# Patient Record
Sex: MALE | Race: WHITE | Employment: OTHER | URBAN - METROPOLITAN AREA
[De-identification: names, ages, dates, MRNs, and addresses within clinical notes are randomized per-mention and may not be internally consistent; named-entity substitution may affect disease eponyms.]

---

## 2017-05-17 PROBLEM — E66.9 OBESITY (BMI 30.0-34.9): Status: ACTIVE | Noted: 2017-05-17

## 2023-03-14 NOTE — PROGRESS NOTES
ENDOSCOPY PREOP INSTRUCTIONS      You are scheduled for a procedure at [facility] on 3-15 @ 1400. You will need to arrival by: 1230 (at least an hour & a half prior to planned start time)  Report to the MAIN entrance on [address] and register at the surgery center on the left-hand side of the lobby  You will need your insurance card and photo id and a list of all medications taken on a regular basis. Please include the dose/frequency. For your procedure:     PLEASE FOLLOW ALL INSTRUCTIONS & PREPS GIVEN TO YOU BY YOUR DOCTOR'S OFFICE. If you have not received these instructions yet, please call the office immediately. Make sure to read them as soon as received. If you are taking blood thinners, Aspirin or diabetic medication, make sure to call your doctor as soon as possible for instructions prior to your procedure. Please dress comfortably and do not wear any lotion, powders or jewelry  Arrange for someone to be with you and sign you out & drive you home after your procedure. THIS PERSON MUST WAIT AT HOSPITAL THE ENTIRE TIME. We allow 2 adult visitors with you in the hospital & masks are strongly recommended.     WOMEN ONLY OF CHILDBEARING AGE: Please make sure to be able to give a urine sample on arrival      If you have further questions, you may contact your Endoscopist's office or Pre Admission Testing staff at [phone]

## 2023-03-15 ENCOUNTER — HOSPITAL ENCOUNTER (OUTPATIENT)
Age: 58
Setting detail: OUTPATIENT SURGERY
Discharge: HOME | End: 2023-03-15
Attending: INTERNAL MEDICINE | Admitting: INTERNAL MEDICINE
Payer: MEDICARE

## 2023-03-15 ENCOUNTER — ANESTHESIA EVENT (OUTPATIENT)
Dept: ENDOSCOPY | Age: 58
End: 2023-03-15
Payer: MEDICARE

## 2023-03-15 ENCOUNTER — ANESTHESIA (OUTPATIENT)
Dept: ENDOSCOPY | Age: 58
End: 2023-03-15
Payer: MEDICARE

## 2023-03-15 VITALS
RESPIRATION RATE: 16 BRPM | HEIGHT: 71 IN | DIASTOLIC BLOOD PRESSURE: 74 MMHG | BODY MASS INDEX: 37.8 KG/M2 | HEART RATE: 76 BPM | SYSTOLIC BLOOD PRESSURE: 125 MMHG | WEIGHT: 270 LBS | OXYGEN SATURATION: 99 % | TEMPERATURE: 97.2 F

## 2023-03-15 DIAGNOSIS — R10.84 GENERALIZED ABDOMINAL PAIN: ICD-10-CM

## 2023-03-15 DIAGNOSIS — R15.0 FECAL INCONTINENCE WITH INCOMPLETE DEFECATION: ICD-10-CM

## 2023-03-15 DIAGNOSIS — R15.9 FECAL INCONTINENCE WITH INCOMPLETE DEFECATION: ICD-10-CM

## 2023-03-15 PROCEDURE — 7100000001 HC PACU RECOVERY - ADDTL 15 MIN: Performed by: INTERNAL MEDICINE

## 2023-03-15 PROCEDURE — 3700000001 HC ADD 15 MINUTES (ANESTHESIA): Performed by: INTERNAL MEDICINE

## 2023-03-15 PROCEDURE — 6360000002 HC RX W HCPCS: Performed by: ANESTHESIOLOGY

## 2023-03-15 PROCEDURE — 7100000010 HC PHASE II RECOVERY - FIRST 15 MIN: Performed by: INTERNAL MEDICINE

## 2023-03-15 PROCEDURE — 2580000003 HC RX 258: Performed by: NURSE ANESTHETIST, CERTIFIED REGISTERED

## 2023-03-15 PROCEDURE — 7100000011 HC PHASE II RECOVERY - ADDTL 15 MIN: Performed by: INTERNAL MEDICINE

## 2023-03-15 PROCEDURE — 3700000000 HC ANESTHESIA ATTENDED CARE: Performed by: INTERNAL MEDICINE

## 2023-03-15 PROCEDURE — 3609012400 HC EGD TRANSORAL BIOPSY SINGLE/MULTIPLE: Performed by: INTERNAL MEDICINE

## 2023-03-15 PROCEDURE — 2580000003 HC RX 258: Performed by: ANESTHESIOLOGY

## 2023-03-15 PROCEDURE — 3609010300 HC COLONOSCOPY W/BIOPSY SINGLE/MULTIPLE: Performed by: INTERNAL MEDICINE

## 2023-03-15 PROCEDURE — 7100000000 HC PACU RECOVERY - FIRST 15 MIN: Performed by: INTERNAL MEDICINE

## 2023-03-15 PROCEDURE — 2709999900 HC NON-CHARGEABLE SUPPLY: Performed by: INTERNAL MEDICINE

## 2023-03-15 PROCEDURE — 88305 TISSUE EXAM BY PATHOLOGIST: CPT

## 2023-03-15 PROCEDURE — 3609010600 HC COLONOSCOPY POLYPECTOMY SNARE/COLD BIOPSY: Performed by: INTERNAL MEDICINE

## 2023-03-15 PROCEDURE — 6360000002 HC RX W HCPCS: Performed by: NURSE ANESTHETIST, CERTIFIED REGISTERED

## 2023-03-15 RX ORDER — ERGOCALCIFEROL 1.25 MG/1
CAPSULE ORAL
COMMUNITY
Start: 2023-03-13

## 2023-03-15 RX ORDER — PROPOFOL 10 MG/ML
INJECTION, EMULSION INTRAVENOUS PRN
Status: DISCONTINUED | OUTPATIENT
Start: 2023-03-15 | End: 2023-03-15 | Stop reason: SDUPTHER

## 2023-03-15 RX ORDER — SODIUM CHLORIDE 0.9 % (FLUSH) 0.9 %
5-40 SYRINGE (ML) INJECTION EVERY 12 HOURS SCHEDULED
Status: DISCONTINUED | OUTPATIENT
Start: 2023-03-15 | End: 2023-03-15 | Stop reason: HOSPADM

## 2023-03-15 RX ORDER — MEPERIDINE HYDROCHLORIDE 25 MG/ML
12.5 INJECTION INTRAMUSCULAR; INTRAVENOUS; SUBCUTANEOUS EVERY 5 MIN PRN
Status: DISCONTINUED | OUTPATIENT
Start: 2023-03-15 | End: 2023-03-15 | Stop reason: HOSPADM

## 2023-03-15 RX ORDER — SODIUM CHLORIDE, SODIUM LACTATE, POTASSIUM CHLORIDE, CALCIUM CHLORIDE 600; 310; 30; 20 MG/100ML; MG/100ML; MG/100ML; MG/100ML
INJECTION, SOLUTION INTRAVENOUS CONTINUOUS
Status: DISCONTINUED | OUTPATIENT
Start: 2023-03-15 | End: 2023-03-15 | Stop reason: HOSPADM

## 2023-03-15 RX ORDER — DIPHENHYDRAMINE HYDROCHLORIDE 50 MG/ML
12.5 INJECTION INTRAMUSCULAR; INTRAVENOUS
Status: DISCONTINUED | OUTPATIENT
Start: 2023-03-15 | End: 2023-03-15 | Stop reason: HOSPADM

## 2023-03-15 RX ORDER — LIDOCAINE HYDROCHLORIDE 10 MG/ML
1 INJECTION, SOLUTION EPIDURAL; INFILTRATION; INTRACAUDAL; PERINEURAL
Status: DISCONTINUED | OUTPATIENT
Start: 2023-03-15 | End: 2023-03-15 | Stop reason: HOSPADM

## 2023-03-15 RX ORDER — SODIUM CHLORIDE 9 MG/ML
25 INJECTION, SOLUTION INTRAVENOUS PRN
Status: DISCONTINUED | OUTPATIENT
Start: 2023-03-15 | End: 2023-03-15 | Stop reason: HOSPADM

## 2023-03-15 RX ORDER — ESOMEPRAZOLE MAGNESIUM 40 MG/1
40 CAPSULE, DELAYED RELEASE ORAL
Qty: 90 CAPSULE | Refills: 1 | Status: SHIPPED | OUTPATIENT
Start: 2023-03-15

## 2023-03-15 RX ORDER — SODIUM CHLORIDE 0.9 % (FLUSH) 0.9 %
5-40 SYRINGE (ML) INJECTION PRN
Status: DISCONTINUED | OUTPATIENT
Start: 2023-03-15 | End: 2023-03-15 | Stop reason: HOSPADM

## 2023-03-15 RX ORDER — HALOPERIDOL 5 MG/1
TABLET ORAL
COMMUNITY
Start: 2023-03-15

## 2023-03-15 RX ORDER — HYDROCORTISONE ACETATE 25 MG/1
25 SUPPOSITORY RECTAL EVERY 12 HOURS
Qty: 28 SUPPOSITORY | Refills: 1 | Status: SHIPPED | OUTPATIENT
Start: 2023-03-15

## 2023-03-15 RX ORDER — SODIUM CHLORIDE, SODIUM LACTATE, POTASSIUM CHLORIDE, CALCIUM CHLORIDE 600; 310; 30; 20 MG/100ML; MG/100ML; MG/100ML; MG/100ML
INJECTION, SOLUTION INTRAVENOUS CONTINUOUS PRN
Status: DISCONTINUED | OUTPATIENT
Start: 2023-03-15 | End: 2023-03-15 | Stop reason: SDUPTHER

## 2023-03-15 RX ORDER — ONDANSETRON 2 MG/ML
INJECTION INTRAMUSCULAR; INTRAVENOUS PRN
Status: DISCONTINUED | OUTPATIENT
Start: 2023-03-15 | End: 2023-03-15 | Stop reason: SDUPTHER

## 2023-03-15 RX ORDER — LABETALOL HYDROCHLORIDE 5 MG/ML
10 INJECTION, SOLUTION INTRAVENOUS
Status: DISCONTINUED | OUTPATIENT
Start: 2023-03-15 | End: 2023-03-15 | Stop reason: HOSPADM

## 2023-03-15 RX ORDER — LIDOCAINE HYDROCHLORIDE 20 MG/ML
INJECTION, SOLUTION INTRAVENOUS PRN
Status: DISCONTINUED | OUTPATIENT
Start: 2023-03-15 | End: 2023-03-15 | Stop reason: SDUPTHER

## 2023-03-15 RX ORDER — LAMOTRIGINE 200 MG/1
TABLET ORAL
COMMUNITY
Start: 2023-03-15

## 2023-03-15 RX ORDER — SUCCINYLCHOLINE CHLORIDE 20 MG/ML
INJECTION INTRAMUSCULAR; INTRAVENOUS PRN
Status: DISCONTINUED | OUTPATIENT
Start: 2023-03-15 | End: 2023-03-15 | Stop reason: SDUPTHER

## 2023-03-15 RX ORDER — METOCLOPRAMIDE HYDROCHLORIDE 5 MG/ML
10 INJECTION INTRAMUSCULAR; INTRAVENOUS
Status: DISCONTINUED | OUTPATIENT
Start: 2023-03-15 | End: 2023-03-15 | Stop reason: HOSPADM

## 2023-03-15 RX ORDER — HYDRALAZINE HYDROCHLORIDE 20 MG/ML
10 INJECTION INTRAMUSCULAR; INTRAVENOUS
Status: DISCONTINUED | OUTPATIENT
Start: 2023-03-15 | End: 2023-03-15 | Stop reason: HOSPADM

## 2023-03-15 RX ADMIN — HYDROMORPHONE HYDROCHLORIDE 0.5 MG: 1 INJECTION, SOLUTION INTRAMUSCULAR; INTRAVENOUS; SUBCUTANEOUS at 15:15

## 2023-03-15 RX ADMIN — PROPOFOL 200 MG: 10 INJECTION, EMULSION INTRAVENOUS at 13:56

## 2023-03-15 RX ADMIN — SODIUM CHLORIDE, POTASSIUM CHLORIDE, SODIUM LACTATE AND CALCIUM CHLORIDE: 600; 310; 30; 20 INJECTION, SOLUTION INTRAVENOUS at 12:59

## 2023-03-15 RX ADMIN — SODIUM CHLORIDE, SODIUM LACTATE, POTASSIUM CHLORIDE, AND CALCIUM CHLORIDE: .6; .31; .03; .02 INJECTION, SOLUTION INTRAVENOUS at 13:50

## 2023-03-15 RX ADMIN — SUCCINYLCHOLINE CHLORIDE 120 MG: 20 INJECTION, SOLUTION INTRAMUSCULAR; INTRAVENOUS; PARENTERAL at 13:57

## 2023-03-15 RX ADMIN — HYDROMORPHONE HYDROCHLORIDE 0.5 MG: 1 INJECTION, SOLUTION INTRAMUSCULAR; INTRAVENOUS; SUBCUTANEOUS at 14:58

## 2023-03-15 RX ADMIN — LIDOCAINE HYDROCHLORIDE 50 MG: 20 INJECTION, SOLUTION INTRAVENOUS at 13:56

## 2023-03-15 RX ADMIN — ONDANSETRON 4 MG: 2 INJECTION INTRAMUSCULAR; INTRAVENOUS at 14:22

## 2023-03-15 ASSESSMENT — PAIN DESCRIPTION - DESCRIPTORS
DESCRIPTORS: BURNING
DESCRIPTORS: ACHING

## 2023-03-15 ASSESSMENT — PAIN DESCRIPTION - LOCATION
LOCATION: ABDOMEN;BUTTOCKS

## 2023-03-15 ASSESSMENT — PAIN DESCRIPTION - PAIN TYPE: TYPE: SURGICAL PAIN

## 2023-03-15 ASSESSMENT — PAIN SCALES - GENERAL
PAINLEVEL_OUTOF10: 3
PAINLEVEL_OUTOF10: 6
PAINLEVEL_OUTOF10: 6
PAINLEVEL_OUTOF10: 5
PAINLEVEL_OUTOF10: 6

## 2023-03-15 ASSESSMENT — PAIN - FUNCTIONAL ASSESSMENT
PAIN_FUNCTIONAL_ASSESSMENT: 0-10
PAIN_FUNCTIONAL_ASSESSMENT: PREVENTS OR INTERFERES SOME ACTIVE ACTIVITIES AND ADLS
PAIN_FUNCTIONAL_ASSESSMENT: ACTIVITIES ARE NOT PREVENTED
PAIN_FUNCTIONAL_ASSESSMENT: ACTIVITIES ARE NOT PREVENTED

## 2023-03-15 ASSESSMENT — PAIN DESCRIPTION - FREQUENCY
FREQUENCY: CONTINUOUS
FREQUENCY: CONTINUOUS

## 2023-03-15 ASSESSMENT — PAIN DESCRIPTION - ONSET
ONSET: ON-GOING
ONSET: ON-GOING

## 2023-03-15 NOTE — DISCHARGE INSTRUCTIONS
Endoscopy Discharge Instructions    Call the physician that did your procedure for any questions or concerns. You may be drowsy or lightheaded after receiving sedation or anesthesia. Do not operate any vehicles (automobiles, bicycles, motorcycles) or power tools or machinery for 24 hours. Do not sign any legal documents or make any legal decisions for 24 hours. Do not drink alcohol for 24 hours or while taking narcotic pain medication. A responsible person should be with you for the next 24 hours. Plan on bed rest or quiet relaxation today. Resume normal activities in the morning. Resume normal activity tomorrow unless otherwise advised by your physician. Eat a light first meal, avoiding spicy and fatty foods, then resume normal diet unless you are told otherwise by your physician. If the intravenous medication site is painful, apply warm compresses on the site until the soreness is relieved and elevate the arm above the heart. Call your physician if no improvement  in 2-3 days. POSSIBLE SYMPTOMS TO WATCH:     1. fever (greater than 100) 5. increased abdominal bloating   2. severe pain   6. excessive bleeding   3. nausea and vomiting  7. chest pain   4. chills    8. shortness of breath       Notify your physician if these problems occur     Expected as normal and remedies:  Sore throat: use over the counter throat lozenges or gargle with warm salt water. Redness or soreness at the IV site: apply warm compress  Gaseous discomfort: belching or passing flatus (gas). Call for biopsy results in :________________________                    Call for follow-up appointment on:______________________                Educational information given on:_______________________                   We want to thank you for choosing the [facility] as your health care provider. We hope that you received excellent care while you were here.  You may receive a survey in the mail regarding your care. We would appreciate you taking a  few minutes of your time to complete this survey. Again, thank you for choosing the [facility]                   Please review these discharge instructions this evening or tomorrow for               information you may have forgotten.

## 2023-03-15 NOTE — ANESTHESIA POSTPROCEDURE EVALUATION
Department of Anesthesiology  Postprocedure Note    Patient: Juanito Moseley  MRN: [id number]  YOB: 1965  Date of evaluation: 3/15/2023      Procedure Summary     Date: 03/15/23 Room / Location: [address]    Anesthesia Start: 1350 Anesthesia Stop: 8818    Procedures:       EGD BIOPSY      COLONOSCOPY WITH BIOPSY      COLONOSCOPY POLYPECTOMY SNARE/COLD BIOPSY Diagnosis:       Fecal incontinence with incomplete defecation      Generalized abdominal pain      (Fecal incontinence with incomplete defecation [R15.9, R15.0] Generalized abdominal pain [R10.84])    Surgeons: Cristina Cai MD Responsible Provider: Shannan Flowers MD    Anesthesia Type: general ASA Status: 2          Anesthesia Type: No value filed.     Mehnaz Phase I: Mehnaz Score: 10    Mehnaz Phase II: Mehnaz Score: 10      Anesthesia Post Evaluation    Patient location during evaluation: PACU  Patient participation: complete - patient participated  Level of consciousness: awake and alert  Pain score: 0  Airway patency: patent  Nausea & Vomiting: no nausea and no vomiting  Complications: no  Cardiovascular status: hemodynamically stable  Respiratory status: acceptable  Hydration status: euvolemic

## 2023-03-15 NOTE — PROGRESS NOTES
PACU Transfer to [facility]    Procedure(s):  EGD BIOPSY  COLONOSCOPY WITH BIOPSY  COLONOSCOPY POLYPECTOMY SNARE/COLD BIOPSY    Pt's Current Allergies: Morphine and related, Clarithromycin, Peanut-containing drug products, Penicillins, Sulfa antibiotics, and Tetracyclines & related    Pt meets criteria to transfer to next phase of care per GEOFFREY SCORE and ASPAN standards    No results for input(s): POCGLU in the last 72 hours. Vitals:    03/15/23 1515   BP: 132/75   Pulse: 77   Resp: 15   Temp:    SpO2: 100%      BP within 20% of pt's admitting BP as per Geoffrey Score      Intake/Output Summary (Last 24 hours) at 3/15/2023 1519  Last data filed at 3/15/2023 1439  Gross per 24 hour   Intake 500 ml   Output --   Net 500 ml       Pain assessment:  present - adequately treated      Patient was assessed for unknown alterations to skin integrity. There were not unknown alterations observed. Patient transferred to care of Artis Wan RN.    Family updated and directed to Artis Wan    3/15/2023 3:19 PM

## 2023-03-15 NOTE — ANESTHESIA PRE PROCEDURE
Department of Anesthesiology  Preprocedure Note       Name:  Oxana Mueller   Age:  62 y.o.  :  1965                                          MRN:  [id number]         Date:  3/15/2023      Surgeon: Ariadna Alfonso):  Umair Hall MD    Procedure: Procedure(s):  ESOPHAGOGASTRODUODENOSCOPY  COLONOSCOPY    Medications prior to admission:   Prior to Admission medications    Medication Sig Start Date End Date Taking? Authorizing Provider   lamoTRIgine (LAMICTAL) 200 MG tablet  3/15/23   Historical Provider, MD   haloperidol (HALDOL) 5 MG tablet  3/15/23   Historical Provider, MD   vitamin D (ERGOCALCIFEROL) 1.25 MG (36934 UT) CAPS capsule  3/13/23   Historical Provider, MD   cephALEXin (KEFLEX) 500 MG capsule Take 500 mg by mouth 3 times daily    Historical Provider, MD   docusate sodium (COLACE) 100 MG capsule Take 100 mg by mouth 4 times daily    Historical Provider, MD   clonazePAM (KLONOPIN) 2 MG tablet Take 2 mg by mouth 2 times daily 17   Historical Provider, MD   diazepam (VALIUM) 5 MG tablet Take 5 mg by mouth nightly as needed for Anxiety    Historical Provider, MD   HYDROcodone-acetaminophen (NORCO)  MG per tablet Take 1 tablet by mouth every 8 hours as needed for Pain . Historical Provider, MD   Multiple Vitamins-Minerals (THERAPEUTIC MULTIVITAMIN-MINERALS) tablet Take 1 tablet by mouth daily    Historical Provider, MD   sertraline (ZOLOFT) 100 MG tablet Take 100 mg by mouth 2 times daily. Historical Provider, MD   lubiprostone (AMITIZA) 24 MCG capsule Take 24 mcg by mouth 2 times daily (with meals). Patient not taking: Reported on 3/15/2023    Historical Provider, MD   dicyclomine (BENTYL) 10 MG capsule Take 20 mg by mouth 2 times daily. Historical Provider, MD   esomeprazole (NEXIUM) 40 MG capsule Take 40 mg by mouth 2 times daily.       Historical Provider, MD       Current medications:    Current Facility-Administered Medications   Medication Dose Route Frequency Provider Last Rate Last Admin    lidocaine PF 1 % injection 1 mL  1 mL IntraDERmal Once PRN Maria L Craft MD        lactated ringers IV soln infusion   IntraVENous Continuous Maria L Craft MD        sodium chloride flush 0.9 % injection 5-40 mL  5-40 mL IntraVENous 2 times per day Maria L Craft MD        sodium chloride flush 0.9 % injection 5-40 mL  5-40 mL IntraVENous PRN Maria L Craft MD           Allergies: Allergies   Allergen Reactions    Morphine And Related Headaches and Other (See Comments)     Pt. States he gets a migraine      Clarithromycin     Peanut-Containing Drug Products     Penicillins     Sulfa Antibiotics     Tetracyclines & Related        Problem List:    Patient Active Problem List   Diagnosis Code    Obesity (BMI 30.0-34. 9) E66.9       Past Medical History:        Diagnosis Date    Arthritis     Asthma     Chronic back pain     Constipation     Depression     GERD (gastroesophageal reflux disease)     Obesity        Past Surgical History:        Procedure Laterality Date    KNEE SURGERY Bilateral     SPINE SURGERY         Social History:    Social History     Tobacco Use    Smoking status: Never    Smokeless tobacco: Never   Substance Use Topics    Alcohol use: No                                Counseling given: Not Answered      Vital Signs (Current):   Vitals:    03/15/23 1232   BP: 132/79   Pulse: 86   Resp: 16   Temp: 99.7 °F (37.6 °C)   TempSrc: Temporal   SpO2: 97%   Weight: 270 lb (122.5 kg)   Height: 5' 11\" (1.803 m)                                              BP Readings from Last 3 Encounters:   03/15/23 132/79   06/12/17 110/60   05/17/17 120/74       NPO Status:                                                                                 BMI:   Wt Readings from Last 3 Encounters:   03/15/23 270 lb (122.5 kg)   06/12/17 217 lb 8 oz (98.7 kg)   05/17/17 218 lb (98.9 kg)     Body mass index is 37.66 kg/m².     CBC:   Lab Results   Component Value Date/Time    WBC 4.2 04/12/2017 10:23 AM    RBC 4.65 04/12/2017 10:23 AM    HGB 14.2 04/12/2017 10:23 AM    HCT 42.8 04/12/2017 10:23 AM    MCV 92.0 04/12/2017 10:23 AM    RDW 13.5 04/12/2017 10:23 AM     04/12/2017 10:23 AM       CMP:   Lab Results   Component Value Date/Time     04/12/2017 10:23 AM    K 4.8 04/12/2017 10:23 AM     04/12/2017 10:23 AM    CO2 28 04/12/2017 10:23 AM    BUN 13 04/12/2017 10:23 AM    CREATININE 0.6 04/12/2017 10:23 AM    GFRAA >60 04/12/2017 10:23 AM    GFRAA 121 09/23/2011 10:30 AM    AGRATIO 1.8 04/12/2017 10:23 AM    LABGLOM >60 04/12/2017 10:23 AM    LABGLOM 132,109 04/27/2011 05:40 PM    GLUCOSE 99 04/12/2017 10:23 AM    PROT 7.0 04/12/2017 10:23 AM    PROT 7.1 09/23/2011 10:30 AM    CALCIUM 9.9 04/12/2017 10:23 AM    BILITOT 0.3 04/12/2017 10:23 AM    ALKPHOS 61 04/12/2017 10:23 AM    AST 26 04/12/2017 10:23 AM    ALT 20 04/12/2017 10:23 AM       POC Tests: No results for input(s): POCGLU, POCNA, POCK, POCCL, POCBUN, POCHEMO, POCHCT in the last 72 hours.     Coags: No results found for: PROTIME, INR, APTT    HCG (If Applicable): No results found for: PREGTESTUR, PREGSERUM, HCG, HCGQUANT     ABGs: No results found for: PHART, PO2ART, FPK2LOC, PHI3BNK, BEART, V4MGUYWH     Type & Screen (If Applicable):  No results found for: LABABO, LABRH    Drug/Infectious Status (If Applicable):  No results found for: HIV, HEPCAB    COVID-19 Screening (If Applicable): No results found for: COVID19        Anesthesia Evaluation  Patient summary reviewed and Nursing notes reviewed no history of anesthetic complications:   Airway: Mallampati: II  TM distance: >3 FB   Neck ROM: full  Mouth opening: > = 3 FB   Dental: normal exam         Pulmonary:   (+) asthma:                            Cardiovascular:Negative CV ROS                      Neuro/Psych:   (+) psychiatric history:            GI/Hepatic/Renal:   (+) GERD:,           Endo/Other: Negative Endo/Other ROS Abdominal:             Vascular: negative vascular ROS. Other Findings:           Anesthesia Plan      general     ASA 2       Induction: intravenous. MIPS: Postoperative opioids intended and Prophylactic antiemetics administered. Anesthetic plan and risks discussed with patient. Plan discussed with CRNA.     Attending anesthesiologist reviewed and agrees with Preprocedure content                Chacorta Kirkpatrick MD   3/15/2023

## 2023-03-15 NOTE — H&P
GI Endoscopy Outpatient Procedure H&P    Patient: Charley Sarkar MRN: [id number]     YOB: 1965  Age: 62 y.o. Sex: male    Unit: [address] [place] [address] ENDOSCOPY Room/Bed: Endo Pool/NONE Location: [address] [place] [address]     Referring  Physician: Alexandro Calvo PA-C    Charley Sarkar is a 62 y.o. male  here for following procedure:    PROCEDURE:    Procedure(s):  ESOPHAGOGASTRODUODENOSCOPY  COLONOSCOPY    PRE OPERATIVE DIAGNOSIS:   Fecal incontinence with incomplete defecation [R15.9, R15.0]   Generalized abdominal pain [R10.84]  Diarrhea  Rectal bleeding    INDICATIONS:   Same as the preop diagnosis. Pt seen and examined. H& P reviewed. History Obtained From:  patient    Diarrhea and Possible Diverticulitis eval.  h/o Diarrhea with 5 to 7 BM/ day started 1/14/23 and resolved after course of Cipro and Flagyl X 7 0f  10 days  Told to have possible Diverticulitis. No CT done  Associated upper abdominal cramps. NO fever. Diarrhea and pain improved with Abx  Previously seen by Dr. Tahira Stephens last in 2021  H/O IBS- C use of Fiber supplementation Metamucil 2 packets / day and Colace 100 mg BID  BM 2 to3 times in am with feeling of incomplete evacuation. Incontinence episodes may be once weekly. Ch GERD on Omeprazole. Ch intermittent Nausea. Fecal incontinence X 1 yr. h/o 5 Back Surgeries (since 2009, recent back  surgery  2017)  on Hydrocodone 10 mg TID and managed by Pain specialist  No GI bleed symptoms  No weight loss or loss of appetite. RELEVANT HISTORY:  -- Previous COLONOSCOPY: 10/23/2019 Findings: One small colon polyp removed. PATH: tubular adenoma. (5 year recall, 10/2024)  -- 2/14/2018 EGD: normal.    PMH, PSH , Allergies, Medications reviewed.      Past Medical History:   Diagnosis Date    Arthritis     Asthma     Chronic back pain     Constipation     Depression     GERD (gastroesophageal reflux disease)     Obesity        Past Surgical History:   Procedure Laterality Date    CHOLECYSTECTOMY KNEE SURGERY Bilateral     SINUS SURGERY Bilateral     SPINE SURGERY      4 fusions and rods       Allergies: Morphine and related, Clarithromycin, Peanut-containing drug products, Penicillins, Sulfa antibiotics, and Tetracyclines & related   Allergies noted: Yes     Medications reviewed. No current facility-administered medications on file prior to encounter. Current Outpatient Medications on File Prior to Encounter   Medication Sig Dispense Refill    lamoTRIgine (LAMICTAL) 200 MG tablet       haloperidol (HALDOL) 5 MG tablet       vitamin D (ERGOCALCIFEROL) 1.25 MG (50842 UT) CAPS capsule       cephALEXin (KEFLEX) 500 MG capsule Take 500 mg by mouth 3 times daily      docusate sodium (COLACE) 100 MG capsule Take 100 mg by mouth 4 times daily      clonazePAM (KLONOPIN) 2 MG tablet Take 2 mg by mouth 2 times daily      diazepam (VALIUM) 5 MG tablet Take 5 mg by mouth nightly as needed for Anxiety      HYDROcodone-acetaminophen (NORCO)  MG per tablet Take 1 tablet by mouth every 8 hours as needed for Pain . Multiple Vitamins-Minerals (THERAPEUTIC MULTIVITAMIN-MINERALS) tablet Take 1 tablet by mouth daily      sertraline (ZOLOFT) 100 MG tablet Take 100 mg by mouth 2 times daily. lubiprostone (AMITIZA) 24 MCG capsule Take 24 mcg by mouth 2 times daily (with meals). (Patient not taking: Reported on 3/15/2023)      dicyclomine (BENTYL) 10 MG capsule Take 20 mg by mouth 2 times daily. esomeprazole (NEXIUM) 40 MG capsule Take 40 mg by mouth 2 times daily.              Social History:  Social History     Tobacco Use    Smoking status: Never    Smokeless tobacco: Never   Substance Use Topics    Alcohol use: No         Family History:   Family History   Problem Relation Age of Onset    Arthritis Mother     Emphysema Father     Asthma Father     Arthritis Father     Asthma Brother         PHYSICAL EXAM:     VITAL SIGNS   /79   Pulse 86   Temp 99.7 °F (37.6 °C) (Temporal)   Resp 16   Ht 5' 11\" (1.803 m)   Wt 270 lb (122.5 kg)   SpO2 97%   BMI 37.66 kg/m²  I     Height Height: 5' 11\" (180.3 cm)   Weight Weight: 270 lb (122.5 kg)   BMI Body mass index is 37.66 kg/m². Vital signs reviewed:Yes  see nurse documentation as well for details    General appearance:   No Acute distress   Lungs: Good diaphragmatic excursion. No use of accessory muscles of respiration noted. Heart:   RRR on monitor  Abdomen:  Soft, nontender, nondistended. Extremities:   Edema : no  Neuro:  No focal deficits. Alert and oriented      LABS   Basic Metabolic Profile Lab Results   Component Value Date/Time     04/12/2017 10:23 AM    K 4.8 04/12/2017 10:23 AM     04/12/2017 10:23 AM    CO2 28 04/12/2017 10:23 AM    BUN 13 04/12/2017 10:23 AM    CREATININE 0.6 04/12/2017 10:23 AM    GLUCOSE 99 04/12/2017 10:23 AM    MG 2.4 08/17/2011 06:05 PM      Complete Blood Count Lab Results   Component Value Date    WBC 4.2 04/12/2017    HGB 14.2 04/12/2017    HGB 14.4 04/11/2011    HCT 42.8 04/12/2017    HCT 42.2 04/11/2011     04/12/2017      Coagulation Studies No results found for: INR, PTT     No results found for this or any previous visit. ASA Grade:  ASA 2 - Patient with mild systemic disease with no functional limitations   Per Anesthesia  Mallampati Score: II   Per Anesthesia      ASSESSMENT AND PLAN:    1. Patient is a 62 y.o. male with above specified procedure planned   Monitor Anesthesia Care  with anesthesia  2. Procedure options, risks and benefits and alternatives available for the procedure with possible intervention  reviewed with patient. Patient expresses understanding and informed consent obtained prior to the procedure. .    Patient in acceptable condition for procedure:  Yes    Nikkie Baldwin MD  1:43 PM 3/15/2023

## 2023-03-15 NOTE — PROGRESS NOTES
Ambulatory Surgery/Procedure Discharge Note    Vitals:    03/15/23 1525   BP: 125/74   Pulse: 76   Resp: 16   Temp: 97.2 °F (36.2 °C)   SpO2: 99%       In: 500 [I.V.:500]  Out: -     Restroom use offered before discharge.  Yes    Pain assessment:  level of pain (1-10, 10 severe),   Pain Level: 3    Patient discharged to home with brother.       Patient discharged to home/self care. Patient discharged via wheel chair by transporter to waiting family/S.O.       3/15/2023 4:18 PM

## 2023-03-15 NOTE — PROGRESS NOTES
Current Allergies: Morphine and related, Clarithromycin, Peanut-containing drug products, Penicillins, Sulfa antibiotics, and Tetracyclines & related    Procedure(s):  EGD BIOPSY  COLONOSCOPY WITH BIOPSY  COLONOSCOPY POLYPECTOMY SNARE/COLD BIOPSY    No results for input(s): POCGLU in the last 72 hours.    Arrived from ENDO via stretcher to bed3  Placed on monitoring equipment. Report received from nurse from ENDO and anesthesia personnel. No complications during procedure were reported by CRNA  No reported skin issues from ENDO staff

## 2023-03-15 NOTE — PROCEDURES
EGD & COLONOSCOPY Procedures Note      Patient: Zelda Lesches MRN: [id number]     YOB: 1965  Age: 62 y.o. Sex: male    Unit: [address] [place] [address] ENDOSCOPY Room/Bed: Endo Pool/NONE Location: [address] [place] [address]     Admitting Physician: Millicent Mcgrath     Primary Care Physician: Claudean Ion, PA-C    Referring  Physician:      Facility:   [address] [place] [address] [Outpatient]    HISTORY & PHYSICAL:  Patient's history, physical, medications, allergies, labs reviewed prior to procedure. PROCEDURE:   Esophagogastroduodenoscopy with biopsy  Colonoscopy with biopsy, polypectomy (cold snare)  Procedure(s):  EGD BIOPSY  COLONOSCOPY WITH BIOPSY  COLONOSCOPY POLYPECTOMY SNARE/COLD BIOPSY    Preoperative Diagnosis:   Fecal incontinence with incomplete defecation [R15.9, R15.0] Generalized abdominal pain [R10.84]  Procedure(s):  EGD BIOPSY  COLONOSCOPY WITH BIOPSY  COLONOSCOPY POLYPECTOMY SNARE/COLD BIOPSY    Post Operative Diagnosis:  Same as documented in EGD Diagnosis and COLONOSCOPY Diagnosis    INDICATIONS: Zelda Lesches is a 62 y.o. male here for COLONOSCOPY:  EGD:  Same as above. COLONOSCOPY: Same as above. DATE OF OPERATION: [unfilled]    OPERATIVE SURGEON: Lee Hidalgo MD    SCOPE TYPE: Olympus EGD scope and Colonoscope    ASA: No  found with the name: Adriana Shells: General  Anesthesia per pt request  see nurse documentation for details    Written informed consent obtained from  patient. . Risks (including but not limited to perforation, bloating, infection, perforation  and bleeding adverse drug reaction missed lesions and aspiration during the procedure requiring medical or surgical management) benefits and alternatives explained and questions answered. The  patient. verbalized understanding. A timeoout procedure was performed.  Based on the pre-procedure assessment, including review of the patient's medical history, medications, allergies, and review of systems, patient had been deemed to be an appropriate candidate for sedation as planned above. Patient was therefore sedated with the medications listed above. Patient was monitored continuously with electrocardiogram tracing, pulse oximetry, blood pressure monitoring, and direct visualization. EGD Procedure Details:                           A timeoout procedure was performed. Based on the pre-procedure assessment, including review of the patient's medical history, medications, allergies, and review of systems, patient had been deemed to be an appropriate candidate for sedation as planned above. Patient was therefore sedated with the medications listed above. Patient was monitored continuously with electrocardiogram tracing, pulse oximetry, blood pressure monitoring, and direct visualization. Under continue IV sedation with close monitoring, endoscope passed in to the 3rd portion of the duodenum by direct visualization. POST OPERATIVE FINDINGS:   ESOPHAGUS: Diaphragmatic hiatus was 40 cm from incisors and GE junction (upper margin of gastric folds) was at 40 cm from incisors. Squamocolumnar junction was at 40 cm from incisors. Mucosa appeared Irregular at Z line. Biopsy was done to r/o Molina's and dysplasia . STOMACH:  Pylorus patent. Antrum revealed erosive  gastritis,  Body, fundus and cardia, including retroflexed views appear normal .  Stomach insufflated normally. Normal rugal folds. Gastric antral and body of stomach Bx done to  r/o H. Pylori gastritis. DUODENUM: Duodenal bulb  normal. Descending portion of the duodenum appeared  normal.   Biopsy of the 3rd, 2nd part of Du and duodenal bulb done to r/o celiac disease . The patient was then decompressed. Scope was then withdrawn. Patient tolerated procedure well.       DIAGNOSIS:    - Erosive gastritis       RECOMMENDATIONS:   1) cont Nexium  40 mg daily 1/2 hr before food intake  2) Await Pathology results in next 7 days  3) Avoid pain medications like Motrin/Advil/ibuprofen/Aleve/naproxen or similar NSAIDs for 2 weeks      Gastric or Duodenal ulcer present: no  Biopsy done to rule out Helicobacter pylori infection:  yes  GONZALO PATH SPECIMEN SENT:  yes  PHOTOGRAPH TAKEN:  yes In MD Reports    COMPLICATIONS DURING PROCEDURE:   None      EBL: Estimated amount of blood loss is < 1 ml. The Patient turned around for the Colonoscopy. COLONOSCOPY Procedure Details: The patient was placed in the left lateral decubitus position. Kait anal dermatitis noted in the gluteal folds  Anus and digital rectal exam: negative without mass, lesions or tenderness. Sphincter tone  normal.  Under IV sedation, with close monitoring of patient, the Olympus video colonoscope was placed in the patient's rectum and advanced without difficulty  to the terminal ileum. The cecum was identified by characteristic anatomy and ballottment. Examination of the mucosa was performed during both introduction and withdrawal of the colonoscope. Retroflexed view of the rectum was performed. The Quality of Bowel preparation: good. Cecum Intubated: yes    POST OPERATIVE FINDINGS:   TI: Normal mucosa up to 15 cm   Cecum: normal mucosa. Ascending Colon:  normal mucosa. Random biopsies done to rule out microscopic colitis. Hepatic Flexure:  normal mucosa. Transverse Colon: normal mucosa. 2 mm polyp excised with a cold snare. Random biopsies done to rule out microscopic colitis. Splenic Flexure: normal mucosa. Descending Colon:  normal mucosa. Random biopsies done to rule out microscopic colitis. Sigmoid Colon:  normal mucosa. Random biopsies done to rule out microscopic colitis. Rectum: Mild nonspecific proctitis changes in the distal rectum biopsied to rule out ulcerative proctitis versus other pathology.   Retroflexed Views:  Rectum showed small internal hemorrhoids. The patient was then decompressed. Scope was then withdrawn  Patient tolerated the procedure well. DIAGNOSIS:   1) Normal colonic mucosa throughout except Rectum. Random biopsies done to rule out microscopic colitis. 2) mild nonspecific proctitis changes in the distal rectum biopsied to rule out ulcerative proctitis. 3) Normal Terminal ileum up to 15 cm. 4) dermatitis with skin inflammation noted in between the gluteal folds extending up to the presacral area  5) small internal hemorrhoids. RECOMMENDATIONS:   1. Repeat colonoscopy in 5 years. unless pathology dictates otherwise or symptomatic prior to that. 2. Metamucil or Benefiber 2 TSPF daily. 3. Await for the Pathology results in next 7 days  4. Avoid pain medications like Motrin/Advil/ibuprofen/Aleve/naproxen or similar NSAIDs for 2 weeks  5. desitin diaper rash cream to affected skin between gluteal folds for rash. 6. Sitz bath twice daily  7. Anusol HC suppository per rectally BID X 14 days  8. Follow-up appointment with me or NP in 2 weeks. 9.  Discontinue Colace and Amitiza because of diarrhea symptoms  10. Discharge home when standard recovery parameters are met. GONZALO PATH SPECIMEN SENT: yes   PHOTOGRAPH TAKEN: yes     COMPLICATIONS DURING PROCEDURE: None  Estimated blood loss: Estimated amount of blood loss is < 1ml. DISPOSITION: The patient was sent to the recovery room in stable condition. Patient meets criteria for discharge/transfer: yes   Results and plan discussed with the patient and family in detail. CC: Primary Care/Referring Physican(s): Cleve Montgomery PA-C      Signed By:   Lauren Samson MD   [facility]  2:43 PM 3/15/2023    \"Please note that some or all of this record was generated using voice recognition software. If there are any questions about the content of this document, please contact the author as some errors of transcription may have occurred. \"

## (undated) DEVICE — TRAP SPEC RETRV CLR PLAS POLYP IN LN SUCT QUIK CTCH

## (undated) DEVICE — FORCEPS BX L240CM JAW DIA2.4MM ORNG L CAP W/ NDL DISP RAD

## (undated) DEVICE — SNARES COLD OVAL 10MM THIN